# Patient Record
Sex: FEMALE | Race: WHITE
[De-identification: names, ages, dates, MRNs, and addresses within clinical notes are randomized per-mention and may not be internally consistent; named-entity substitution may affect disease eponyms.]

---

## 2018-06-14 ENCOUNTER — HOSPITAL ENCOUNTER (OUTPATIENT)
Dept: HOSPITAL 59 - SUR | Age: 23
Discharge: HOME | End: 2018-06-14
Attending: ORTHOPAEDIC SURGERY
Payer: COMMERCIAL

## 2018-06-14 DIAGNOSIS — G56.01: Primary | ICD-10-CM

## 2018-06-14 PROCEDURE — 81025 URINE PREGNANCY TEST: CPT

## 2018-06-15 NOTE — OPERATIVE NOTE
DATE OF SURGERY: 06/14/2018 



Surgeon: Allen Bedoya DO



PREOPERATIVE DIAGNOSIS: Carpal tunnel syndrome of the right wrist. 



POSTOPERATIVE DIAGNOSIS: Carpal tunnel syndrome of the right wrist. 



OPERATION: Decompression of right median nerve at the wrist using 3.5 loop magnification. 



DESCRIPTION OF PROCEDURE: This 23-year-old female was taken to the operating room and placed in the 
supine position on the operating room table. General anesthesia was induced. The right upper 
extremity was elevated. It was prepped with Hibiclens and draped in the usual sterile fashion. It 
was exsanguinated and the tourniquet inflated to 250 mmHg. 



A palmar incision was utilized following the hypothenar crease from the level of the base of the web 
space of the thumb to the flexor crease of the wrist. Dissection was carried down through the skin 
and subcutaneous tissue. Hemostasis was obtained with the electrocautery. The palmar fascia divided 
in line with the skin incision to expose the flexor retinaculum which was punctured and split to its 
proximal margin. With the contents of the carpal tunnel under direct vision, the transverse carpal 
ligament was transected along its ulnar border. The radial flap was raised to expose the entire 
median nerve under the transverse carpal ligament. The nerve itself showed some hyperemia but the 
recurrent motor branch of the median nerve was identified and found to be normal. The wound was 
irrigated with lactated Ringer's solution and the tourniquet released. Hemostasis was obtained with 
the electrocautery. The wound was closed with interrupted 6-0 nylon suture. Sterile dressings were 
applied and patient taken to the recovery room in satisfactory condition. The patient was placed in 
an AP plaster mold with the wrist in slight dorsiflexion and the thumb in an adducted position. 
[facility]